# Patient Record
Sex: MALE | Race: WHITE | NOT HISPANIC OR LATINO | URBAN - METROPOLITAN AREA
[De-identification: names, ages, dates, MRNs, and addresses within clinical notes are randomized per-mention and may not be internally consistent; named-entity substitution may affect disease eponyms.]

---

## 2024-06-17 ENCOUNTER — APPOINTMENT (OUTPATIENT)
Dept: LAB | Facility: HOSPITAL | Age: 44
End: 2024-06-17
Payer: COMMERCIAL

## 2024-06-17 DIAGNOSIS — I50.20 HFREF (HEART FAILURE WITH REDUCED EJECTION FRACTION) (HCC): ICD-10-CM

## 2024-06-17 DIAGNOSIS — E78.2 MIXED HYPERLIPIDEMIA: ICD-10-CM

## 2024-06-17 LAB
ALBUMIN SERPL BCP-MCNC: 4.6 G/DL (ref 3.5–5)
ALP SERPL-CCNC: 55 U/L (ref 34–104)
ALT SERPL W P-5'-P-CCNC: 15 U/L (ref 7–52)
ANION GAP SERPL CALCULATED.3IONS-SCNC: 7 MMOL/L (ref 4–13)
AST SERPL W P-5'-P-CCNC: 27 U/L (ref 13–39)
BASOPHILS # BLD AUTO: 0.1 THOUSANDS/ÂΜL (ref 0–0.1)
BASOPHILS NFR BLD AUTO: 1 % (ref 0–1)
BILIRUB SERPL-MCNC: 1.58 MG/DL (ref 0.2–1)
BUN SERPL-MCNC: 10 MG/DL (ref 5–25)
CALCIUM SERPL-MCNC: 9.7 MG/DL (ref 8.4–10.2)
CHLORIDE SERPL-SCNC: 104 MMOL/L (ref 96–108)
CHOLEST SERPL-MCNC: 187 MG/DL
CO2 SERPL-SCNC: 28 MMOL/L (ref 21–32)
CREAT SERPL-MCNC: 0.79 MG/DL (ref 0.6–1.3)
EOSINOPHIL # BLD AUTO: 0.23 THOUSAND/ÂΜL (ref 0–0.61)
EOSINOPHIL NFR BLD AUTO: 2 % (ref 0–6)
ERYTHROCYTE [DISTWIDTH] IN BLOOD BY AUTOMATED COUNT: 12.7 % (ref 11.6–15.1)
GFR SERPL CREATININE-BSD FRML MDRD: 107 ML/MIN/1.73SQ M
GLUCOSE P FAST SERPL-MCNC: 122 MG/DL (ref 65–99)
HCT VFR BLD AUTO: 53.2 % (ref 36.5–49.3)
HDLC SERPL-MCNC: 36 MG/DL
HGB BLD-MCNC: 17.5 G/DL (ref 12–17)
IMM GRANULOCYTES # BLD AUTO: 0.05 THOUSAND/UL (ref 0–0.2)
IMM GRANULOCYTES NFR BLD AUTO: 0 % (ref 0–2)
LDLC SERPL CALC-MCNC: 129 MG/DL (ref 0–100)
LYMPHOCYTES # BLD AUTO: 2.58 THOUSANDS/ÂΜL (ref 0.6–4.47)
LYMPHOCYTES NFR BLD AUTO: 18 % (ref 14–44)
MCH RBC QN AUTO: 29.8 PG (ref 26.8–34.3)
MCHC RBC AUTO-ENTMCNC: 32.9 G/DL (ref 31.4–37.4)
MCV RBC AUTO: 91 FL (ref 82–98)
MONOCYTES # BLD AUTO: 1.04 THOUSAND/ÂΜL (ref 0.17–1.22)
MONOCYTES NFR BLD AUTO: 7 % (ref 4–12)
NEUTROPHILS # BLD AUTO: 10.15 THOUSANDS/ÂΜL (ref 1.85–7.62)
NEUTS SEG NFR BLD AUTO: 72 % (ref 43–75)
NONHDLC SERPL-MCNC: 151 MG/DL
NRBC BLD AUTO-RTO: 0 /100 WBCS
PLATELET # BLD AUTO: 198 THOUSANDS/UL (ref 149–390)
PMV BLD AUTO: 13.2 FL (ref 8.9–12.7)
POTASSIUM SERPL-SCNC: 4.4 MMOL/L (ref 3.5–5.3)
PROT SERPL-MCNC: 7.6 G/DL (ref 6.4–8.4)
RBC # BLD AUTO: 5.87 MILLION/UL (ref 3.88–5.62)
SODIUM SERPL-SCNC: 139 MMOL/L (ref 135–147)
TRIGL SERPL-MCNC: 110 MG/DL
WBC # BLD AUTO: 14.15 THOUSAND/UL (ref 4.31–10.16)

## 2024-06-17 PROCEDURE — 80061 LIPID PANEL: CPT

## 2024-06-17 PROCEDURE — 36415 COLL VENOUS BLD VENIPUNCTURE: CPT

## 2024-06-17 PROCEDURE — 85025 COMPLETE CBC W/AUTO DIFF WBC: CPT

## 2024-06-17 PROCEDURE — 80053 COMPREHEN METABOLIC PANEL: CPT

## 2025-04-30 ENCOUNTER — TELEPHONE (OUTPATIENT)
Dept: UROLOGY | Facility: CLINIC | Age: 45
End: 2025-04-30

## 2025-04-30 NOTE — TELEPHONE ENCOUNTER
Called and left the pt 2 VM-   1st vm was that his appt would be cx as the insurance he has I out of network as it is a [employer] insurance-     As I was in the pts chart Mora Quarles was in there and I messaged her in regards to the insurance, she stated she was on the phone with [employer] in regards to the pt patients insurance, after speaking to her the pt can come here and the insurance was put in incorrectly.     I called the pt back and left another VM that his appt is back on for 5/2/25 at 7:20am- let pt know that his insurance was put in the system wrong and that we do accept the insurance that he has, I apologized for the mishap and reassured him in the message that the appt is good for Friday and we will see him then.     Provided the patient with a call back number in the 1st message- if pt calls back please advise him that the appt for Friday is still on and that his insurance is getting corrected in the system.

## 2025-04-30 NOTE — TELEPHONE ENCOUNTER
Attempted to call pt again just to confirm he received the vm for his appt on Friday that he is still on.. phone seemed like it was answered but the other end there was silence.

## 2025-05-01 NOTE — PROGRESS NOTES
Name: Omi Boone Jr.      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 Surgical margins negative for carcinoma.   Lymphovascular invasion not identified.   - Obtain CT abdomen, CT chest, and CMP  - Follow up to review results.   - I have strongly recommended complete smoking cessation and he is working on stopping this and has successfully cut back.   Orders:    CT abdomen w wo contrast; Future    CT chest wo contrast; Future    Comprehensive metabolic panel; Future        History of Present Illness   Omi Boone Jr. is a 45 y.o. male who presents for new patient evaluation of RCC. Transfer of care from [facility] urology. [facility] left renal mass. Pathology positive for grade 2, clear cell RCC. He reports prior to surgery he had a right renal vein thrombosis and was on Eliquis which was discontinued due to this resolving. He continues to smoke cigarettes daily however is trying to quit. He does have history of CHF and MI. He notes occasional flank pain to right kidney. No urinary complaints or hematuria. No family history of  malignancy.     Review of Systems   Constitutional:  Negative for chills and fever.   Respiratory:  Negative for shortness of breath.    Cardiovascular:  Negative for chest pain.   Gastrointestinal:  Negative for abdominal pain.   Genitourinary:  Negative for difficulty urinating, dysuria, flank pain, frequency, hematuria and urgency.   Neurological:  Negative for dizziness.          Objective   There were no vitals taken for this visit.    Physical Exam  Constitutional:        "Appearance: Normal appearance.   HENT:      Head: Normocephalic and atraumatic.      Right Ear: External ear normal.      Left Ear: External ear normal.      Nose: Nose normal.   Eyes:      General: No scleral icterus.     Conjunctiva/sclera: Conjunctivae normal.   Cardiovascular:      Pulses: Normal pulses.   Pulmonary:      Effort: Pulmonary effort is normal.   Musculoskeletal:         General: Normal range of motion.      Cervical back: Normal range of motion.   Neurological:      General: No focal deficit present.      Mental Status: He is alert and oriented to person, place, and time.   Psychiatric:         Mood and Affect: Mood normal.         Behavior: Behavior normal.         Thought Content: Thought content normal.         Judgment: Judgment normal.           Results   No results found for: \"PSA\"  Lab Results   Component Value Date    CALCIUM 9.7 12/19/2024    K 4.3 12/19/2024    CO2 23 12/19/2024     12/19/2024    BUN 16 12/19/2024    CREATININE 1.52 (H) 12/19/2024     Lab Results   Component Value Date    WBC 14.15 (H) 06/17/2024    HGB 14.6 12/16/2024    HCT 43.2 12/16/2024    MCV 91 06/17/2024     06/17/2024       Office Urine Dip  No results found for this or any previous visit (from the past hour).      "

## 2025-05-02 ENCOUNTER — OFFICE VISIT (OUTPATIENT)
Dept: UROLOGY | Facility: CLINIC | Age: 45
End: 2025-05-02
Payer: COMMERCIAL

## 2025-05-02 VITALS
HEIGHT: 73 IN | BODY MASS INDEX: 31.17 KG/M2 | DIASTOLIC BLOOD PRESSURE: 72 MMHG | WEIGHT: 235.2 LBS | HEART RATE: 79 BPM | SYSTOLIC BLOOD PRESSURE: 114 MMHG | OXYGEN SATURATION: 96 % | TEMPERATURE: 97.6 F | RESPIRATION RATE: 18 BRPM

## 2025-05-02 DIAGNOSIS — C64.2 RENAL CELL CARCINOMA OF LEFT KIDNEY (HCC): Primary | ICD-10-CM

## 2025-05-02 PROCEDURE — 99204 OFFICE O/P NEW MOD 45 MIN: CPT | Performed by: PHYSICIAN ASSISTANT

## 2025-05-02 RX ORDER — PANTOPRAZOLE SODIUM 40 MG/1
1 TABLET, DELAYED RELEASE ORAL EVERY MORNING
COMMUNITY
Start: 2025-02-14

## 2025-05-02 RX ORDER — CARVEDILOL 12.5 MG/1
1 TABLET ORAL 2 TIMES DAILY WITH MEALS
COMMUNITY
Start: 2025-02-04

## 2025-05-02 RX ORDER — ASPIRIN 81 MG/1
81 TABLET, CHEWABLE ORAL DAILY
COMMUNITY

## 2025-05-02 RX ORDER — SPIRONOLACTONE 25 MG/1
25 TABLET ORAL DAILY
COMMUNITY
Start: 2025-02-14 | End: 2026-02-14

## 2025-05-02 RX ORDER — PRASUGREL 10 MG/1
10 TABLET, FILM COATED ORAL DAILY
COMMUNITY
Start: 2024-07-25 | End: 2025-07-25

## 2025-05-02 RX ORDER — ZOLPIDEM TARTRATE 10 MG/1
TABLET ORAL
COMMUNITY

## 2025-05-02 RX ORDER — SERTRALINE HYDROCHLORIDE 25 MG/1
25 TABLET, FILM COATED ORAL DAILY
COMMUNITY
Start: 2024-11-27 | End: 2025-11-27

## 2025-05-02 RX ORDER — ROSUVASTATIN CALCIUM 40 MG/1
40 TABLET, COATED ORAL
COMMUNITY
Start: 2025-02-14

## 2025-05-27 ENCOUNTER — OFFICE VISIT (OUTPATIENT)
Dept: FAMILY MEDICINE CLINIC | Facility: CLINIC | Age: 45
End: 2025-05-27
Payer: COMMERCIAL

## 2025-05-27 VITALS
HEIGHT: 73 IN | RESPIRATION RATE: 18 BRPM | DIASTOLIC BLOOD PRESSURE: 84 MMHG | SYSTOLIC BLOOD PRESSURE: 112 MMHG | HEART RATE: 70 BPM | OXYGEN SATURATION: 97 % | WEIGHT: 229 LBS | BODY MASS INDEX: 30.35 KG/M2

## 2025-05-27 DIAGNOSIS — R73.9 ELEVATED BLOOD SUGAR: ICD-10-CM

## 2025-05-27 DIAGNOSIS — Z11.59 NEED FOR HEPATITIS C SCREENING TEST: ICD-10-CM

## 2025-05-27 DIAGNOSIS — I42.9 CARDIOMYOPATHY, UNSPECIFIED TYPE (HCC): ICD-10-CM

## 2025-05-27 DIAGNOSIS — Z90.5 STATUS POST NEPHRECTOMY: ICD-10-CM

## 2025-05-27 DIAGNOSIS — I25.10 CORONARY ARTERY DISEASE INVOLVING NATIVE HEART WITHOUT ANGINA PECTORIS, UNSPECIFIED VESSEL OR LESION TYPE: ICD-10-CM

## 2025-05-27 DIAGNOSIS — Z12.11 COLON CANCER SCREENING: ICD-10-CM

## 2025-05-27 DIAGNOSIS — K21.9 GASTROESOPHAGEAL REFLUX DISEASE, UNSPECIFIED WHETHER ESOPHAGITIS PRESENT: ICD-10-CM

## 2025-05-27 DIAGNOSIS — G47.00 INSOMNIA, UNSPECIFIED TYPE: ICD-10-CM

## 2025-05-27 DIAGNOSIS — Z00.00 HEALTH MAINTENANCE EXAMINATION: Primary | ICD-10-CM

## 2025-05-27 DIAGNOSIS — I10 PRIMARY HYPERTENSION: ICD-10-CM

## 2025-05-27 DIAGNOSIS — F32.A DEPRESSION, UNSPECIFIED DEPRESSION TYPE: ICD-10-CM

## 2025-05-27 DIAGNOSIS — Z72.0 TOBACCO USE: ICD-10-CM

## 2025-05-27 DIAGNOSIS — Z11.4 SCREENING FOR HIV (HUMAN IMMUNODEFICIENCY VIRUS): ICD-10-CM

## 2025-05-27 DIAGNOSIS — Z95.810 ICD (IMPLANTABLE CARDIOVERTER-DEFIBRILLATOR) IN PLACE: ICD-10-CM

## 2025-05-27 DIAGNOSIS — E78.5 HYPERLIPIDEMIA, UNSPECIFIED HYPERLIPIDEMIA TYPE: ICD-10-CM

## 2025-05-27 DIAGNOSIS — R11.2 NAUSEA AND VOMITING, UNSPECIFIED VOMITING TYPE: ICD-10-CM

## 2025-05-27 PROBLEM — Z95.0 PACEMAKER: Status: ACTIVE | Noted: 2025-05-27

## 2025-05-27 PROCEDURE — 99386 PREV VISIT NEW AGE 40-64: CPT | Performed by: FAMILY MEDICINE

## 2025-05-27 RX ORDER — ZOLPIDEM TARTRATE 10 MG/1
10 TABLET ORAL
Qty: 90 TABLET | Refills: 1 | Status: SHIPPED | OUTPATIENT
Start: 2025-05-27

## 2025-05-27 RX ORDER — MIRTAZAPINE 45 MG/1
45 TABLET, FILM COATED ORAL EVERY EVENING
COMMUNITY
Start: 2025-05-03

## 2025-05-27 RX ORDER — ONDANSETRON 4 MG/1
4 TABLET, ORALLY DISINTEGRATING ORAL EVERY 6 HOURS PRN
Qty: 30 TABLET | Refills: 5 | Status: SHIPPED | OUTPATIENT
Start: 2025-05-27

## 2025-05-27 NOTE — PROGRESS NOTES
Depression Screening and Follow-up Plan: Patient's depression screening was positive with a PHQ-2 score of 3. Their PHQ-9 score was 11.       Tobacco Cessation Counseling: Tobacco cessation counseling was provided. The patient is sincerely urged to quit consumption of tobacco. He is not ready to quit tobacco.     Assessment/Plan:    Return visit in 6 months.     Problem List Items Addressed This Visit       Cardiomyopathy (HCC)    Coronary artery disease involving native heart without angina pectoris    Depression    Relevant Medications    mirtazapine (REMERON) 45 MG tablet    zolpidem (AMBIEN) 10 mg tablet    Elevated blood sugar    Relevant Orders    Hemoglobin A1C    Gastroesophageal reflux disease    Health maintenance examination - Primary    Hyperlipidemia    Relevant Orders    CBC and differential    Comprehensive metabolic panel    Lipid panel    ICD (implantable cardioverter-defibrillator) in place    Insomnia    Relevant Medications    zolpidem (AMBIEN) 10 mg tablet    Primary hypertension    Status post nephrectomy    Relevant Orders    UA (URINE) with reflex to Scope    Tobacco use     Other Visit Diagnoses         Nausea and vomiting, unspecified vomiting type        Relevant Medications    ondansetron (ZOFRAN-ODT) 4 mg disintegrating tablet    Other Relevant Orders    Ambulatory Referral to Gastroenterology      Colon cancer screening        Relevant Orders    Ambulatory Referral to Gastroenterology      Screening for HIV (human immunodeficiency virus)        Relevant Orders    HIV 1/2 AB/AG w Reflex [facility] for 2 yr old and above      Need for hepatitis C screening test        Relevant Orders    Hepatitis C antibody              Subjective:      Patient ID: Omi Laurentkerry Shi is a 45 y.o. male.    This is a new patient to our practice.  He has coronary artery disease and has had 2 stenting procedures.  He had a MI with ejection fraction now at 30 to 40%.  Last December he had CAT scan of chest which  showed renal mass.  He had nephrectomy for renal cell carcinoma.  He has syncope 1-2 times a week when he gets up from bed.  This improved with recent discontinuation of Imdur.  He also has nausea and vomiting every morning for the last year.        The following portions of the patient's history were reviewed and updated as appropriate:   Past Medical History:  He has a past medical history of Anxiety (2010), Cancer (HCC) (2024), Chronic kidney disease (2024), Coronary artery disease (2023), Depression (2010), Hypertension (2023), and Myocardial infarction (HCC) (2023).,  _______________________________________________________________________  Medical Problems:  does not have any pertinent problems on file.,  _______________________________________________________________________  Past Surgical History:   has a past surgical history that includes Cardiac surgery and Knee surgery.,  _______________________________________________________________________  Family History:  family history includes Anxiety disorder in his mother and sister; Bipolar disorder in his mother; Breast cancer in his mother; Cancer in his maternal grandfather; Diabetes in his paternal grandmother; Heart disease in his maternal grandmother.,  _______________________________________________________________________  Social History:   reports that he has been smoking cigarettes. He has a 15 pack-year smoking history. He has never used smokeless tobacco. He reports that he does not drink alcohol and does not use drugs.,  _______________________________________________________________________  Allergies:  has no known allergies..  _______________________________________________________________________  Current Medications[1]  _______________________________________________________________________  Review of Systems   Constitutional: Negative.    Respiratory: Negative.     Cardiovascular: Negative.    Gastrointestinal:  Positive for nausea and  "vomiting.   Neurological:  Positive for syncope.         Objective:  Vitals:    05/27/25 1443   BP: 112/84   BP Location: Left arm   Patient Position: Sitting   Cuff Size: Large   Pulse: 70   Resp: 18   SpO2: 97%   Weight: 104 kg (229 lb)   Height: 6' 1\" (1.854 m)     Body mass index is 30.21 kg/m².     Physical Exam  Constitutional:       Appearance: Normal appearance. He is well-developed.   HENT:      Head: Normocephalic and atraumatic.     Eyes:      Pupils: Pupils are equal, round, and reactive to light.       Cardiovascular:      Rate and Rhythm: Normal rate and regular rhythm.      Heart sounds: Normal heart sounds.   Pulmonary:      Effort: Pulmonary effort is normal.      Breath sounds: Normal breath sounds.   Abdominal:      General: Bowel sounds are normal.     Musculoskeletal:         General: Normal range of motion.      Cervical back: Neck supple.     Skin:     General: Skin is warm and dry.     Neurological:      Mental Status: He is alert.     Psychiatric:         Mood and Affect: Mood normal.         Behavior: Behavior normal.              [1]   Current Outpatient Medications   Medication Sig Dispense Refill    aspirin 81 mg chewable tablet Chew 81 mg in the morning.      carvedilol (COREG) 12.5 mg tablet Take 1 tablet by mouth in the morning and 1 tablet in the evening. Take with meals.      mirtazapine (REMERON) 45 MG tablet Take 45 mg by mouth every evening      ondansetron (ZOFRAN-ODT) 4 mg disintegrating tablet Take 1 tablet (4 mg total) by mouth every 6 (six) hours as needed for nausea or vomiting 30 tablet 5    pantoprazole (PROTONIX) 40 mg tablet Take 1 tablet by mouth every morning      prasugrel (EFFIENT) tablet Take 10 mg by mouth in the morning.      rosuvastatin (CRESTOR) 40 MG tablet Take 40 mg by mouth      sertraline (ZOLOFT) 25 mg tablet Take 25 mg by mouth in the morning.      spironolactone (ALDACTONE) 25 mg tablet Take 25 mg by mouth in the morning.      zolpidem (AMBIEN) 10 mg " tablet Take 1 tablet (10 mg total) by mouth daily at bedtime as needed for sleep 90 tablet 1     No current facility-administered medications for this visit.

## 2025-05-29 ENCOUNTER — TELEPHONE (OUTPATIENT)
Dept: GASTROENTEROLOGY | Facility: CLINIC | Age: 45
End: 2025-05-29

## 2025-05-29 NOTE — TELEPHONE ENCOUNTER
Left message for patient to return call to schedule an appointment    R11.2 (ICD-10-CM) - Nausea and vomiting, unspecified vomiting type  Z12.11 (ICD-10-CM) - Colon cancer screening

## 2025-06-03 ENCOUNTER — TELEPHONE (OUTPATIENT)
Dept: UROLOGY | Facility: CLINIC | Age: 45
End: 2025-06-03

## 2025-06-03 NOTE — TELEPHONE ENCOUNTER
This nurse attempted to call patient to make him aware of Shannon OLSEN instructions to have CMP prior to having CT scan on 6/9/25. Detailed voicemail left providing office phone number and lab hours.     If patient calls back please relay message accordingly. Thank you!!     Shannon Driscoll PA-C  [facility] [place] [facility]  Please inform patient to go for CMP prior to CT

## 2025-06-04 ENCOUNTER — APPOINTMENT (EMERGENCY)
Dept: CT IMAGING | Facility: HOSPITAL | Age: 45
End: 2025-06-04
Payer: COMMERCIAL

## 2025-06-04 ENCOUNTER — HOSPITAL ENCOUNTER (EMERGENCY)
Facility: HOSPITAL | Age: 45
Discharge: HOME | End: 2025-06-04
Attending: EMERGENCY MEDICINE
Payer: COMMERCIAL

## 2025-06-04 VITALS
DIASTOLIC BLOOD PRESSURE: 74 MMHG | HEART RATE: 71 BPM | SYSTOLIC BLOOD PRESSURE: 126 MMHG | RESPIRATION RATE: 17 BRPM | BODY MASS INDEX: 29.69 KG/M2 | WEIGHT: 225 LBS | OXYGEN SATURATION: 98 % | TEMPERATURE: 97.3 F

## 2025-06-04 DIAGNOSIS — Z95.810 ICD (IMPLANTABLE CARDIOVERTER-DEFIBRILLATOR) IN PLACE: ICD-10-CM

## 2025-06-04 DIAGNOSIS — R55 SYNCOPE: Primary | ICD-10-CM

## 2025-06-04 LAB
2HR DELTA HS TROPONIN: 0 NG/L
4HR DELTA HS TROPONIN: 1 NG/L
ALBUMIN SERPL BCG-MCNC: 4.7 G/DL (ref 3.5–5)
ALP SERPL-CCNC: 56 U/L (ref 34–104)
ALT SERPL W P-5'-P-CCNC: 16 U/L (ref 7–52)
ANION GAP SERPL CALCULATED.3IONS-SCNC: 9 MMOL/L (ref 4–13)
AST SERPL W P-5'-P-CCNC: 21 U/L (ref 13–39)
ATRIAL RATE: 68 BPM
ATRIAL RATE: 69 BPM
BASOPHILS # BLD AUTO: 0.09 THOUSANDS/ÂΜL (ref 0–0.1)
BASOPHILS NFR BLD AUTO: 1 % (ref 0–1)
BILIRUB SERPL-MCNC: 1.12 MG/DL (ref 0.2–1)
BNP SERPL-MCNC: 37 PG/ML (ref 0–100)
BUN SERPL-MCNC: 14 MG/DL (ref 5–25)
CALCIUM SERPL-MCNC: 10.1 MG/DL (ref 8.4–10.2)
CARDIAC TROPONIN I PNL SERPL HS: 6 NG/L (ref ?–50)
CARDIAC TROPONIN I PNL SERPL HS: 6 NG/L (ref ?–50)
CARDIAC TROPONIN I PNL SERPL HS: 7 NG/L (ref ?–50)
CHLORIDE SERPL-SCNC: 102 MMOL/L (ref 96–108)
CO2 SERPL-SCNC: 27 MMOL/L (ref 21–32)
CREAT SERPL-MCNC: 1.37 MG/DL (ref 0.6–1.3)
EOSINOPHIL # BLD AUTO: 0.4 THOUSAND/ÂΜL (ref 0–0.61)
EOSINOPHIL NFR BLD AUTO: 4 % (ref 0–6)
ERYTHROCYTE [DISTWIDTH] IN BLOOD BY AUTOMATED COUNT: 12.3 % (ref 11.6–15.1)
GFR SERPL CREATININE-BSD FRML MDRD: 60 ML/MIN/1.73SQ M
GLUCOSE SERPL-MCNC: 102 MG/DL (ref 65–140)
HCT VFR BLD AUTO: 49.4 % (ref 36.5–49.3)
HGB BLD-MCNC: 17.1 G/DL (ref 12–17)
IMM GRANULOCYTES # BLD AUTO: 0.04 THOUSAND/UL (ref 0–0.2)
IMM GRANULOCYTES NFR BLD AUTO: 0 % (ref 0–2)
LYMPHOCYTES # BLD AUTO: 2.32 THOUSANDS/ÂΜL (ref 0.6–4.47)
LYMPHOCYTES NFR BLD AUTO: 21 % (ref 14–44)
MAGNESIUM SERPL-MCNC: 1.9 MG/DL (ref 1.9–2.7)
MCH RBC QN AUTO: 31 PG (ref 26.8–34.3)
MCHC RBC AUTO-ENTMCNC: 34.6 G/DL (ref 31.4–37.4)
MCV RBC AUTO: 90 FL (ref 82–98)
MONOCYTES # BLD AUTO: 0.75 THOUSAND/ÂΜL (ref 0.17–1.22)
MONOCYTES NFR BLD AUTO: 7 % (ref 4–12)
NEUTROPHILS # BLD AUTO: 7.25 THOUSANDS/ÂΜL (ref 1.85–7.62)
NEUTS SEG NFR BLD AUTO: 67 % (ref 43–75)
NRBC BLD AUTO-RTO: 0 /100 WBCS
P AXIS: 49 DEGREES
P AXIS: 67 DEGREES
PLATELET # BLD AUTO: 198 THOUSANDS/UL (ref 149–390)
PMV BLD AUTO: 11.5 FL (ref 8.9–12.7)
POTASSIUM SERPL-SCNC: 4.1 MMOL/L (ref 3.5–5.3)
PR INTERVAL: 172 MS
PR INTERVAL: 176 MS
PROT SERPL-MCNC: 7.9 G/DL (ref 6.4–8.4)
QRS AXIS: -42 DEGREES
QRS AXIS: -45 DEGREES
QRSD INTERVAL: 94 MS
QRSD INTERVAL: 98 MS
QT INTERVAL: 392 MS
QT INTERVAL: 398 MS
QTC INTERVAL: 420 MS
QTC INTERVAL: 423 MS
RBC # BLD AUTO: 5.51 MILLION/UL (ref 3.88–5.62)
SODIUM SERPL-SCNC: 138 MMOL/L (ref 135–147)
T WAVE AXIS: 56 DEGREES
T WAVE AXIS: 74 DEGREES
VENTRICULAR RATE: 68 BPM
VENTRICULAR RATE: 69 BPM
WBC # BLD AUTO: 10.85 THOUSAND/UL (ref 4.31–10.16)

## 2025-06-04 PROCEDURE — 84484 ASSAY OF TROPONIN QUANT: CPT | Performed by: EMERGENCY MEDICINE

## 2025-06-04 PROCEDURE — 93010 ELECTROCARDIOGRAM REPORT: CPT | Performed by: INTERNAL MEDICINE

## 2025-06-04 PROCEDURE — 83735 ASSAY OF MAGNESIUM: CPT | Performed by: EMERGENCY MEDICINE

## 2025-06-04 PROCEDURE — 74177 CT ABD & PELVIS W/CONTRAST: CPT

## 2025-06-04 PROCEDURE — 99285 EMERGENCY DEPT VISIT HI MDM: CPT

## 2025-06-04 PROCEDURE — 36415 COLL VENOUS BLD VENIPUNCTURE: CPT | Performed by: EMERGENCY MEDICINE

## 2025-06-04 PROCEDURE — 71275 CT ANGIOGRAPHY CHEST: CPT

## 2025-06-04 PROCEDURE — 80053 COMPREHEN METABOLIC PANEL: CPT | Performed by: EMERGENCY MEDICINE

## 2025-06-04 PROCEDURE — 85025 COMPLETE CBC W/AUTO DIFF WBC: CPT | Performed by: EMERGENCY MEDICINE

## 2025-06-04 PROCEDURE — 83880 ASSAY OF NATRIURETIC PEPTIDE: CPT | Performed by: EMERGENCY MEDICINE

## 2025-06-04 PROCEDURE — 99285 EMERGENCY DEPT VISIT HI MDM: CPT | Performed by: EMERGENCY MEDICINE

## 2025-06-04 PROCEDURE — 93005 ELECTROCARDIOGRAM TRACING: CPT

## 2025-06-04 RX ADMIN — IOHEXOL 100 ML: 350 INJECTION, SOLUTION INTRAVENOUS at 12:07

## 2025-06-04 NOTE — TELEPHONE ENCOUNTER
I called debbie and informed him that he does not need any additional testing. He expressed understanding

## 2025-06-04 NOTE — TELEPHONE ENCOUNTER
Patient had a CT today while in the ED for a heart condition, also had CMP    He wants to  be sure that he does not need to complete the CT placed by Shannon. Informed patient not likely that he will need to repeat but will let provider be aware.

## 2025-06-04 NOTE — ED PROVIDER NOTES
Time reflects when diagnosis was documented in both MDM as applicable and the Disposition within this note       Time User Action Codes Description Comment    6/4/2025  1:34 PM Shelley Tejeda [R55] Syncope     6/4/2025  1:34 PM Shelley Tejeda [Z95.810] ICD (implantable cardioverter-defibrillator) in place           ED Disposition       ED Disposition   Discharge    Condition   Stable    Date/Time   Wed Jun 4, 2025  1:34 PM    Comment   Omi Boone Jr. discharge to home/self care.                   Assessment & Plan       Medical Decision Making  45 y.o. male presents for evaluation after a possible syncopal episode with a possible AICD delivered shock. On exam, patient with normal vitals, in no acute distress. His heart is regular rate and rhythm.  EKG shows a normal sinus rhythm with a left axis deviation, ST segment or T wave abnormalities.  Differential diagnosis includes, but is not limited to, cardiac arrhythmia, ACS, acute electrolyte disturbances, or other acute pathology.  Will evaluate with CBC, CMP, troponin, BNP, magnesium level.  Will interrogate patient's AICD.    Amount and/or Complexity of Data Reviewed  Labs: ordered. Decision-making details documented in ED Course.  Radiology: ordered. Decision-making details documented in ED Course.    Risk  Prescription drug management.        ED Course as of 06/04/25 1335   Wed Jun 04, 2025   0916 Procedure Note: EKG  Date/Time: 06/04/25 9:16 AM   Interpreted by: Shelley Tejeda D.O.  Indications / Diagnosis: AICD discharge  ECG reviewed by me, the ED Provider: yes   The EKG demonstrates:  Rhythm: normal sinus  Intervals: normal intervals  Axis: left axis  QRS/Blocks: normal QRS  ST Changes: No acute ST Changes, no STD/JAIME.   0927 WBC(!): 10.85   0927 Hemoglobin(!): 17.1   0928 CBC appears mildly hemoconcentrated with an elevated Hgb.    0950 Comprehensive metabolic panel(!)  Unremarkable when compared to previous.    0950 MAGNESIUM: 1.9   0950 hs TnI 0hr: 6    0950 BNP: 37   0950 Patient's AICD was interrogated.  Per interrogation, no shocks delivered.  There was an event recorded at 7:50 AM today, however, unsure of what this event was. Will touch base with cardiology.    1120 On-call cardiologist states that they will look further into the AICD interrogation report to figure out what the event was this morning. Will continue to monitor patient.    1153 AICD report read by cardiology who talked to [employer] representatives.  No events noted this morning, the event that was recorded was a routine test.  Given the patient's near syncopal episode without other explanation, concern for possible PE, aortic pathology, or other acute pathology.  Patient is scheduled to have a CT scan of the chest, as well as a CT scan of the abdomen and pelvis later this week as he recently had severe right sided abdominal pain and has been having almost daily nausea and vomiting since his nephrectomy in December.  Discussed with patient and his wife at bedside, will obtain CTA PE study with abdomen pelvis at this time for further evaluation, and then patient will not have to have the CT scans performed later this week.  They are agreeable.  CTA ordered at this time.   1156 Delta 2hr hsTnI: 0   1309 CT pe study w abdomen pelvis w contrast  Unremarkable, no PE or other acute pathology.    1331 Discussed test results with patient and his wife at bedside.  Patient's symptoms may have been due to a vasovagal event.  He was encouraged to follow-up with his cardiologist for further evaluation.  He was also told to touch base with the urology team to cancel his upcoming CT scans.  Patient stable for discharge at this time.  He was given symptomatic care instructions and strict ED return precautions.       Medications   iohexol (OMNIPAQUE) 350 MG/ML injection (MULTI-DOSE) 100 mL (100 mL Intravenous Given 6/4/25 1207)       ED Risk Strat Scores                    No data  recorded            Wells' Criteria for PE      Flowsheet Row Most Recent Value   Wells' Criteria for PE    Clinical signs and symptoms of DVT 0 Filed at: 06/04/2025 1147   PE is primary diagnosis or equally likely 0 Filed at: 06/04/2025 1147   HR >100 0 Filed at: 06/04/2025 1147   Immobilization at least 3 days or Surgery in the previous 4 weeks 0 Filed at: 06/04/2025 1147   Previous, objectively diagnosed PE or DVT 0 Filed at: 06/04/2025 1147   Hemoptysis 0 Filed at: 06/04/2025 1147   Malignancy with treatment within 6 months or palliative 1 Filed at: 06/04/2025 1147   Wells' Criteria Total 1 Filed at: 06/04/2025 1147                        History of Present Illness       Chief Complaint   Patient presents with    Medical Problem - Re-Evaluation     Defibrillator went off around 8 this morning       Past Medical History[1]   Past Surgical History[2]   Family History[3]   Social History[4]   E-Cigarette/Vaping    E-Cigarette Use Never User       E-Cigarette/Vaping Substances    Nicotine No     THC No     CBD No     Flavoring No     Unknown No       I have reviewed and agree with the history as documented.     45-year-old male with a past medical history of cardiomyopathy presents for evaluation after AICD shocked him.  Patient states that he believes he may have passed out, states he is slumped over at his desk.  The next thing he knew, he felt something strange in his chest, believes his AICD may have shocked him.  He now reports feeling shaky and generally weak.  He denies any chest pain or shortness of breath currently.  No other concerns at this time.        Review of Systems   Respiratory:  Negative for shortness of breath.    Cardiovascular:  Negative for chest pain.   Neurological:  Positive for syncope.   All other systems reviewed and are negative.          Objective       ED Triage Vitals   Temperature Pulse Blood Pressure Respirations SpO2 Patient Position - Orthostatic VS   06/04/25 0904 06/04/25 0904  06/04/25 0904 06/04/25 0904 06/04/25 0904 06/04/25 0904   (!) 97.3 °F (36.3 °C) 68 130/86 16 100 % Sitting      Temp Source Heart Rate Source BP Location FiO2 (%) Pain Score    06/04/25 0904 06/04/25 0904 06/04/25 0904 -- 06/04/25 1130    Oral Monitor Left arm  No Pain      Vitals      Date and Time Temp Pulse SpO2 Resp BP Pain Score FACES Pain Rating User   06/04/25 1300 -- 71 98 % 17 126/74 -- -- SM   06/04/25 1215 -- 79 100 % 14 132/83 No Pain -- SM   06/04/25 1130 -- 71 99 % 11 124/75 No Pain -- SM   06/04/25 1000 -- 65 97 % 17 130/90 -- -- SM   06/04/25 0945 -- 63 100 % 17 118/77 -- -- SM   06/04/25 0904 97.3 °F (36.3 °C) 68 100 % 16 130/86 -- -- LM            Physical Exam  Vitals and nursing note reviewed.   Constitutional:       General: He is awake. He is not in acute distress.     Appearance: He is not toxic-appearing.   HENT:      Head: Normocephalic and atraumatic.     Eyes:      General: Vision grossly intact. Gaze aligned appropriately.       Cardiovascular:      Rate and Rhythm: Normal rate and regular rhythm.      Heart sounds: Normal heart sounds.   Pulmonary:      Effort: Pulmonary effort is normal. No respiratory distress.      Breath sounds: Normal breath sounds.     Musculoskeletal:      Cervical back: Full passive range of motion without pain and neck supple.     Skin:     General: Skin is warm and dry.     Neurological:      General: No focal deficit present.      Mental Status: He is alert and oriented to person, place, and time.         Results Reviewed       Procedure Component Value Units Date/Time    HS Troponin I 4hr [[id number]] Collected: 06/04/25 1325    Lab Status: In process Specimen: Blood from Arm, Right Updated: 06/04/25 1328    HS Troponin I 2hr [[id number]]  (Normal) Collected: 06/04/25 1121    Lab Status: Final result Specimen: Blood from Arm, Right Updated: 06/04/25 1153     hs TnI 2hr 6 ng/L      Delta 2hr hsTnI 0 ng/L     B-Type Natriuretic Peptide(BNP) [[id number]]  (Normal)  Collected: 06/04/25 0919    Lab Status: Final result Specimen: Blood from Arm, Right Updated: 06/04/25 0948     BNP 37 pg/mL     HS Troponin 0hr (reflex protocol) [[id number]]  (Normal) Collected: 06/04/25 0919    Lab Status: Final result Specimen: Blood from Arm, Right Updated: 06/04/25 0947     hs TnI 0hr 6 ng/L     Comprehensive metabolic panel [[id number]]  (Abnormal) Collected: 06/04/25 0919    Lab Status: Final result Specimen: Blood from Arm, Right Updated: 06/04/25 0938     Sodium 138 mmol/L      Potassium 4.1 mmol/L      Chloride 102 mmol/L      CO2 27 mmol/L      ANION GAP 9 mmol/L      BUN 14 mg/dL      Creatinine 1.37 mg/dL      Glucose 102 mg/dL      Calcium 10.1 mg/dL      AST 21 U/L      ALT 16 U/L      Alkaline Phosphatase 56 U/L      Total Protein 7.9 g/dL      Albumin 4.7 g/dL      Total Bilirubin 1.12 mg/dL      eGFR 60 ml/min/1.73sq m     Narrative:      National Kidney Disease Foundation guidelines for Chronic Kidney Disease (CKD):     Stage 1 with normal or high GFR (GFR > 90 mL/min/1.73 square meters)    Stage 2 Mild CKD (GFR = 60-89 mL/min/1.73 square meters)    Stage 3A Moderate CKD (GFR = 45-59 mL/min/1.73 square meters)    Stage 3B Moderate CKD (GFR = 30-44 mL/min/1.73 square meters)    Stage 4 Severe CKD (GFR = 15-29 mL/min/1.73 square meters)    Stage 5 End Stage CKD (GFR <15 mL/min/1.73 square meters)  Note: GFR calculation is accurate only with a steady state creatinine    Magnesium [[id number]]  (Normal) Collected: 06/04/25 0919    Lab Status: Final result Specimen: Blood from Arm, Right Updated: 06/04/25 0938     Magnesium 1.9 mg/dL     CBC and differential [[id number]]  (Abnormal) Collected: 06/04/25 0919    Lab Status: Final result Specimen: Blood from Arm, Right Updated: 06/04/25 0924     WBC 10.85 Thousand/uL      RBC 5.51 Million/uL      Hemoglobin 17.1 g/dL      Hematocrit 49.4 %      MCV 90 fL      MCH 31.0 pg      MCHC 34.6 g/dL      RDW 12.3 %      MPV 11.5 fL      Platelets 198  Thousands/uL      nRBC 0 /100 WBCs      Segmented % 67 %      Immature Grans % 0 %      Lymphocytes % 21 %      Monocytes % 7 %      Eosinophils Relative 4 %      Basophils Relative 1 %      Absolute Neutrophils 7.25 Thousands/µL      Absolute Immature Grans 0.04 Thousand/uL      Absolute Lymphocytes 2.32 Thousands/µL      Absolute Monocytes 0.75 Thousand/µL      Eosinophils Absolute 0.40 Thousand/µL      Basophils Absolute 0.09 Thousands/µL             CT pe study w abdomen pelvis w contrast   Final Interpretation by Kevin Gray MD (06/04 1257)      1.  Negative for PE.   2.  No acute abnormality in the chest, abdomen, or pelvis.         Workstation performed: [id number]             Procedures    ED Medication and Procedure Management   Prior to Admission Medications   Prescriptions Last Dose Informant Patient Reported? Taking?   aspirin 81 mg chewable tablet  Self Yes No   Sig: Chew 81 mg in the morning.   carvedilol (COREG) 12.5 mg tablet  Self Yes No   Sig: Take 1 tablet by mouth in the morning and 1 tablet in the evening. Take with meals.   mirtazapine (REMERON) 45 MG tablet   Yes No   Sig: Take 45 mg by mouth every evening   ondansetron (ZOFRAN-ODT) 4 mg disintegrating tablet   No No   Sig: Take 1 tablet (4 mg total) by mouth every 6 (six) hours as needed for nausea or vomiting   pantoprazole (PROTONIX) 40 mg tablet  Self Yes No   Sig: Take 1 tablet by mouth every morning   prasugrel (EFFIENT) tablet  Self Yes No   Sig: Take 10 mg by mouth in the morning.   rosuvastatin (CRESTOR) 40 MG tablet  Self Yes No   Sig: Take 40 mg by mouth   sertraline (ZOLOFT) 25 mg tablet  Self Yes No   Sig: Take 25 mg by mouth in the morning.   spironolactone (ALDACTONE) 25 mg tablet  Self Yes No   Sig: Take 25 mg by mouth in the morning.   zolpidem (AMBIEN) 10 mg tablet   No No   Sig: Take 1 tablet (10 mg total) by mouth daily at bedtime as needed for sleep      Facility-Administered Medications: None     Patient's Medications    Discharge Prescriptions    No medications on file     No discharge procedures on file.  ED SEPSIS DOCUMENTATION   Time reflects when diagnosis was documented in both MDM as applicable and the Disposition within this note       Time User Action Codes Description Comment    6/4/2025  1:34 PM Shelley Tejeda [R55] Syncope     6/4/2025  1:34 PM Shelley Tejeda [Z95.810] ICD (implantable cardioverter-defibrillator) in place                    [1]   Past Medical History:  Diagnosis Date    Anxiety 2010    Cancer (HCC) 2024    Chronic kidney disease 2024    Coronary artery disease 2023    Depression 2010    Hypertension 2023    Myocardial infarction (HCC) 2023   [2]   Past Surgical History:  Procedure Laterality Date    CARDIAC SURGERY      KNEE SURGERY     [3]   Family History  Problem Relation Name Age of Onset    Breast cancer Mother Shania     Anxiety disorder Mother Shania     Bipolar disorder Mother Shania     Cancer Maternal Grandfather Bill     Heart disease Maternal Grandmother Gisell     Diabetes Paternal Grandmother Madai     Anxiety disorder Sister Janae    [4]   Social History  Tobacco Use    Smoking status: Every Day     Current packs/day: 0.50     Average packs/day: 0.5 packs/day for 30.0 years (15.0 ttl pk-yrs)     Types: Cigarettes    Smokeless tobacco: Never    Tobacco comments:     Smoke less then a half of a pack per day   Vaping Use    Vaping status: Never Used   Substance Use Topics    Alcohol use: Never    Drug use: Never        Shelley Tejeda DO  06/04/25 1826

## 2025-06-05 DIAGNOSIS — I25.10 CORONARY ARTERY DISEASE INVOLVING NATIVE HEART WITHOUT ANGINA PECTORIS, UNSPECIFIED VESSEL OR LESION TYPE: Primary | ICD-10-CM

## 2025-06-09 RX ORDER — ASPIRIN 81 MG
81 TABLET,CHEWABLE ORAL DAILY
Qty: 90 TABLET | Refills: 3 | Status: SHIPPED | OUTPATIENT
Start: 2025-06-09

## 2025-06-24 NOTE — PROGRESS NOTES
Name: Omi Boone Jr.      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 Surgical margins negative for carcinoma.   Lymphovascular invasion not identified.   - CT PE Study w A/P w contrast from 25 - No discrete pulmonary nodules visualized. Status post left nephrectomy. The right kidney is normal in appearance without evidence of hydronephrosis or nephrolithiasis. No evidence of abnormal soft tissue thickening at the resection site to suggest residual/recurrent disease.   - CMP ( 25) - Cr 1.37, GFR 60   - Follow up in 6 months with CT abdomen and CMP  Orders:    CT abdomen w wo contrast; Future    Comprehensive metabolic panel; Future        History of Present Illness   Omi Boone Jr. is a 45 y.o. male who presents for follow up of RCC. Transfer of care from [facility] urology. He had left radical nephrectomy in 2024 with [facility] for 7 cm left renal mass. Pathology positive for grade 2, clear cell RCC. He denies any flank pain or hematuria. Does note occasional slower urinary stream that is not bothersome to him at this time. No dysuria.     AUA SYMPTOM SCORE      Flowsheet Row Most Recent Value   AUA SYMPTOM SCORE    How often have you had a sensation of not emptying your bladder completely after you finished urinating? 2 (P)     How often have you had to urinate again less than two hours after you finished urinating? 4 (P)     How often have you found you stopped and started again several times when you urinate? 3 (P)     How often have you found it difficult to postpone urination? 2 (P)     How often have you had a weak urinary stream? 4 (P)     How  "often have you had to push or strain to begin urination? 4 (P)     How many times did you most typically get up to urinate from the time you went to bed at night until the time you got up in the morning? 1 (P)     Quality of Life: If you were to spend the rest of your life with your urinary condition just the way it is now, how would you feel about that? 2 (P)     AUA SYMPTOM SCORE 20 (P)            Review of Systems   Constitutional:  Negative for chills and fever.   Respiratory:  Negative for shortness of breath.    Cardiovascular:  Negative for chest pain.   Gastrointestinal:  Negative for abdominal pain.   Genitourinary:  Negative for difficulty urinating, dysuria, flank pain, frequency, hematuria and urgency.   Neurological:  Negative for dizziness.          Objective   There were no vitals taken for this visit.    Physical Exam  Constitutional:       Appearance: Normal appearance.   HENT:      Head: Normocephalic and atraumatic.      Right Ear: External ear normal.      Left Ear: External ear normal.      Nose: Nose normal.     Eyes:      General: No scleral icterus.     Conjunctiva/sclera: Conjunctivae normal.       Cardiovascular:      Pulses: Normal pulses.   Pulmonary:      Effort: Pulmonary effort is normal.     Musculoskeletal:         General: Normal range of motion.      Cervical back: Normal range of motion.     Neurological:      General: No focal deficit present.      Mental Status: He is alert and oriented to person, place, and time.     Psychiatric:         Mood and Affect: Mood normal.         Behavior: Behavior normal.         Thought Content: Thought content normal.         Judgment: Judgment normal.          Results   No results found for: \"PSA\"  Lab Results   Component Value Date    CALCIUM 10.1 06/04/2025    K 4.1 06/04/2025    CO2 27 06/04/2025     06/04/2025    BUN 14 06/04/2025    CREATININE 1.37 (H) 06/04/2025     Lab Results   Component Value Date    WBC 10.85 (H) 06/04/2025    " HGB 17.1 (H) 06/04/2025    HCT 49.4 (H) 06/04/2025    MCV 90 06/04/2025     06/04/2025       Office Urine Dip  No results found for this or any previous visit (from the past hour).

## 2025-06-25 ENCOUNTER — OFFICE VISIT (OUTPATIENT)
Dept: UROLOGY | Facility: CLINIC | Age: 45
End: 2025-06-25
Payer: COMMERCIAL

## 2025-06-25 ENCOUNTER — OFFICE VISIT (OUTPATIENT)
Dept: GASTROENTEROLOGY | Facility: CLINIC | Age: 45
End: 2025-06-25
Payer: COMMERCIAL

## 2025-06-25 VITALS
SYSTOLIC BLOOD PRESSURE: 128 MMHG | TEMPERATURE: 97.5 F | OXYGEN SATURATION: 96 % | HEART RATE: 78 BPM | BODY MASS INDEX: 29.95 KG/M2 | WEIGHT: 226 LBS | HEIGHT: 73 IN | DIASTOLIC BLOOD PRESSURE: 74 MMHG

## 2025-06-25 VITALS
HEIGHT: 73 IN | SYSTOLIC BLOOD PRESSURE: 128 MMHG | HEART RATE: 78 BPM | WEIGHT: 226 LBS | DIASTOLIC BLOOD PRESSURE: 74 MMHG | OXYGEN SATURATION: 96 % | TEMPERATURE: 97.5 F | BODY MASS INDEX: 29.95 KG/M2

## 2025-06-25 DIAGNOSIS — R11.2 NAUSEA AND VOMITING, UNSPECIFIED VOMITING TYPE: ICD-10-CM

## 2025-06-25 DIAGNOSIS — R68.81 EARLY SATIETY: ICD-10-CM

## 2025-06-25 DIAGNOSIS — I25.10 CORONARY ARTERY DISEASE INVOLVING NATIVE HEART WITHOUT ANGINA PECTORIS, UNSPECIFIED VESSEL OR LESION TYPE: ICD-10-CM

## 2025-06-25 DIAGNOSIS — R19.7 DIARRHEA, UNSPECIFIED TYPE: Primary | ICD-10-CM

## 2025-06-25 DIAGNOSIS — C64.2 RENAL CELL CARCINOMA OF LEFT KIDNEY (HCC): Primary | ICD-10-CM

## 2025-06-25 DIAGNOSIS — Z12.11 COLON CANCER SCREENING: ICD-10-CM

## 2025-06-25 PROCEDURE — 99204 OFFICE O/P NEW MOD 45 MIN: CPT | Performed by: PHYSICIAN ASSISTANT

## 2025-06-25 PROCEDURE — 99214 OFFICE O/P EST MOD 30 MIN: CPT | Performed by: PHYSICIAN ASSISTANT

## 2025-06-25 NOTE — PROGRESS NOTES
Name: Omi Boone Jr.      : 1980      MRN: [id number]  Encounter Provider: Leah Graff PA-C  Encounter Date: 2025   Encounter department: [facility] [place]  :  Assessment & Plan  Nausea and vomiting, unspecified vomiting type  Early satiety      Orders:    Ambulatory Referral to Gastroenterology    H. pylori antigen, stool; Future    TSH, 3rd generation; Future    T4, free; Future    NM gastric emptying; Future  Due to patient's recent cardiac history with PCI and stenting performed in 2024, it is unclear whether or not he would be able to obtain cardiac clearance.  Patient wishes that we do not contact his previous cardiologist, and he would like to establish with cardiology here, fortunately already has an appointment on July 10.  Because the patient has been experiencing dizziness and chest pain, we will await recommendations by cardiology prior to scheduling any type of procedure.  Patient likely will have to remain on Effient and aspirin uninterrupted until October, reports that he was told this previously.  His symptoms have a wide differential, and would benefit from endoscopy to investigate.  Doubt biliary etiology given recent MRI in November without findings suggesting cholelithiasis or sludge.  May be secondary to peptic ulcer disease, H. pylori, gastroparesis, hyperemesis of cannabis, cyclical vomiting syndrome, etc. For now given the circumstances with his heart, we will plan for noninvasive workup such as stool testing and blood work.  -Labs as above, gastric emptying study  -Patient reports he is already cut down on his marijuana usage, down to once daily  -Continue pantoprazole  -Continue Zofran every 6 hours as needed  -Small, frequent meals avoiding high fat and high fiber  Colon cancer screening    Orders:    Ambulatory Referral to Gastroenterology  See plan above.  Once patient is cleared to hold his anticoagulation, we will plan  "for an eventual colonoscopy.  Patient could theoretically have a Cologuard, but the patient would like the most sensitive test to investigate given his personal history of malignancy therefore we will hold off until we can obtain clearance.  Diarrhea, unspecified type    Orders:    Celiac Panel/Adult; Future    Coronary artery disease involving native heart without angina pectoris, unspecified vessel or lesion type  On Effient and aspirin.  Last cardiac intervention was in October 2024.  Follow-up after above workup and cardiology consult in July.  Patient agrees with above plan.    History of Present Illness   HPI  Omi Boone Jr. is a 45 y.o. male with history of clear-cell renal carcinoma status post nephrectomy in 2024, CAD on 81 mg aspirin and Effient, chronic systolic heart failure status post ICD, and hypertension who presents to the office today to establish with GI.  Patient reports for 1 year he has been having vomiting, primarily in the morning with daytime nausea.  Patient reports that he has had other ongoing symptoms that he will be following up with cardiology for.  Reports that he is switching his care from [facility] to [facility].  Patient reports that in his emesis, he usually does not see undigested food.  Mostly sees \"phlegm\" and \"brown chunks.\"  Patient denies signs or GI bleeding such as hematemesis or coffee-ground emesis.  However, does experience rectal bleeding with altered bowel habits.    Last CT scan was in June 2025 revealing no PE and no other acute abnormality in the chest, abdomen and pelvis.  His last MRI abdomen during workup for renal mass revealed that the gallbladder/bile ducts appeared normal and the pancreas appeared normal as well according to report.    He has never had an EGD or colonoscopy.  Reports over the last year he has lost over 100 pounds.      Review of Systems   Constitutional:  Negative for appetite change, chills, diaphoresis, fatigue and unexpected " weight change.   HENT:  Negative for sore throat and trouble swallowing.    Eyes:  Negative for discharge and redness.   Respiratory:  Negative for cough, shortness of breath and wheezing.    Cardiovascular:  Positive for chest pain. Negative for palpitations.   Gastrointestinal:  Positive for abdominal distention, anal bleeding, constipation, diarrhea, nausea and vomiting. Negative for abdominal pain, blood in stool and rectal pain.   Endocrine: Negative for cold intolerance and heat intolerance.   Musculoskeletal:  Negative for joint swelling and myalgias.   Skin:  Negative for pallor and rash.   Neurological:  Positive for dizziness. Negative for tremors, weakness, light-headedness, numbness and headaches.   Hematological:  Negative for adenopathy. Does not bruise/bleed easily.   Psychiatric/Behavioral:  Negative for behavioral problems, confusion, dysphoric mood and sleep disturbance. The patient is not nervous/anxious.           Objective   There were no vitals taken for this visit.     Physical Exam  Constitutional:       General: He is not in acute distress.     Appearance: He is well-developed. He is not diaphoretic.   HENT:      Head: Normocephalic and atraumatic.     Eyes:      General: No scleral icterus.     Conjunctiva/sclera: Conjunctivae normal.      Pupils: Pupils are equal, round, and reactive to light.     Neck:      Thyroid: No thyromegaly.      Trachea: No tracheal deviation.     Cardiovascular:      Rate and Rhythm: Normal rate and regular rhythm.   Pulmonary:      Effort: Pulmonary effort is normal.      Breath sounds: Normal breath sounds. No wheezing or rales.   Abdominal:      General: Bowel sounds are normal. There is no distension.      Palpations: Abdomen is soft. Abdomen is not rigid. There is no mass.      Tenderness: There is no abdominal tenderness. There is no guarding or rebound.     Musculoskeletal:      Cervical back: Neck supple.   Lymphadenopathy:      Cervical: No cervical  adenopathy.     Skin:     General: Skin is warm and dry.      Findings: No erythema or rash.     Neurological:      Mental Status: He is alert and oriented to person, place, and time.     Psychiatric:         Behavior: Behavior normal.

## 2025-06-26 PROBLEM — Z00.00 HEALTH MAINTENANCE EXAMINATION: Status: RESOLVED | Noted: 2025-05-27 | Resolved: 2025-06-26

## 2025-07-10 ENCOUNTER — OFFICE VISIT (OUTPATIENT)
Dept: CARDIOLOGY CLINIC | Facility: CLINIC | Age: 45
End: 2025-07-10
Payer: COMMERCIAL

## 2025-07-10 ENCOUNTER — IN-CLINIC DEVICE VISIT (OUTPATIENT)
Dept: CARDIOLOGY CLINIC | Facility: CLINIC | Age: 45
End: 2025-07-10
Payer: COMMERCIAL

## 2025-07-10 VITALS
HEART RATE: 80 BPM | BODY MASS INDEX: 29.16 KG/M2 | WEIGHT: 220 LBS | RESPIRATION RATE: 16 BRPM | DIASTOLIC BLOOD PRESSURE: 84 MMHG | SYSTOLIC BLOOD PRESSURE: 132 MMHG | OXYGEN SATURATION: 96 % | HEIGHT: 73 IN

## 2025-07-10 DIAGNOSIS — R07.9 CHEST PAIN, UNSPECIFIED TYPE: Primary | ICD-10-CM

## 2025-07-10 DIAGNOSIS — E78.2 MIXED HYPERLIPIDEMIA: ICD-10-CM

## 2025-07-10 DIAGNOSIS — F12.90 MARIJUANA USE: ICD-10-CM

## 2025-07-10 DIAGNOSIS — Z95.5 S/P PRIMARY ANGIOPLASTY WITH CORONARY STENT: ICD-10-CM

## 2025-07-10 DIAGNOSIS — Z90.5 STATUS POST NEPHRECTOMY: ICD-10-CM

## 2025-07-10 DIAGNOSIS — I25.10 CORONARY ARTERY DISEASE INVOLVING NATIVE HEART WITHOUT ANGINA PECTORIS, UNSPECIFIED VESSEL OR LESION TYPE: ICD-10-CM

## 2025-07-10 DIAGNOSIS — I50.42 CHRONIC COMBINED SYSTOLIC AND DIASTOLIC CONGESTIVE HEART FAILURE (HCC): ICD-10-CM

## 2025-07-10 DIAGNOSIS — Z95.810 ICD (IMPLANTABLE CARDIOVERTER-DEFIBRILLATOR) IN PLACE: ICD-10-CM

## 2025-07-10 DIAGNOSIS — Z72.0 TOBACCO USE: ICD-10-CM

## 2025-07-10 DIAGNOSIS — I25.5 ISCHEMIC CARDIOMYOPATHY: ICD-10-CM

## 2025-07-10 DIAGNOSIS — I47.20 VENTRICULAR TACHYCARDIA (HCC): Primary | ICD-10-CM

## 2025-07-10 PROCEDURE — 99205 OFFICE O/P NEW HI 60 MIN: CPT | Performed by: INTERNAL MEDICINE

## 2025-07-10 PROCEDURE — 93282 PRGRMG EVAL IMPLANTABLE DFB: CPT | Performed by: INTERNAL MEDICINE

## 2025-07-10 RX ORDER — SACUBITRIL AND VALSARTAN 24; 26 MG/1; MG/1
1 TABLET, FILM COATED ORAL 2 TIMES DAILY
COMMUNITY

## 2025-07-10 NOTE — ASSESSMENT & PLAN NOTE
Most recent transthoracic echo in February 2025 at [facility] reported LVEF of 35 to 39%.  Continue GDMT for HFrEF.  We will reevaluate LV function now that he had complete revascularization.

## 2025-07-10 NOTE — ASSESSMENT & PLAN NOTE
Patient admits to continued smoking cigarettes. He was informed of the adverse cardiovascular effects of marijuana and tobacco use and was strongly advised cessation.

## 2025-07-10 NOTE — ASSESSMENT & PLAN NOTE
Patient has hx of left clear-cell carcinoma status post nephrectomy in December 2024.  Avoid nephrotoxic medication such as NSAIDs.

## 2025-07-10 NOTE — PROGRESS NOTES
Results for orders placed or performed in visit on 07/10/25   Cardiac EP device report    Narrative    DEVICE INTERROGATED IN THE [place] OFFICE: PT TRANSFERRED CARE FROM [facility]: PT SEEN TODAY BY DR. CRAWFORD.  BATTERY VOLTAGE ADEQUATE (14 YRS). : <1%. ALL LEAD PARAMETERS WITHIN NORMAL LIMITS. NO SIGNIFICANT HIGH RATE EPISODES. HEARTLOGIC HEART FAILURE INDEX WITHIN NORMAL LIMITS @ 5. NO PROGRAMMING CHANGES MADE TO DEVICE PARAMETERS. NORMAL DEVICE FUNCTION. CH

## 2025-07-10 NOTE — PROGRESS NOTES
Cardiology Consultation     Omi Boone JrTom  [id number]  1980  [address] [facility] [place]  [address]  [place] [id number]    Assessment & Plan  Chest pain, unspecified type  An exercise treadmill stress with myocardial perfusion imaging is requested to evaluate reported atypical chest pain with known multivessel CAD.    Multivessel CAD, s/p primary angioplasty with coronary stent  Patient has known multivessel CAD status post PCI with NAKUL x 4 of the  of the RCA on October 11, 2024, status post PCI with NAKUL of the totally occluded OM1 in the setting of unstable angina on July 25, 2024, patent left main and patent proximal LAD stent on cardiac catheterization of July 25, 2024, status post primary PCI with 3.5 x 30 drug-eluting stent to the proximal LAD in the setting of acute anterior STEMI on February 7, 2023.    As discussed, an exercise treadmill stress with myocardial perfusion imaging is requested to objectively determine and/or correlate coronary ischemic burden to reported persistent chest pain. Continue OMT for CAD. Medical compliance to uninterrupted DAPT (ASA and Prasugrel) was reinforced.   Ischemic cardiomyopathy  Most recent transthoracic echo in February 2025 at [facility] reported LVEF of 35 to 39%.  Continue GDMT for HFrEF.  We will reevaluate LV function now that he had complete revascularization.  Chronic combined systolic and diastolic congestive heart failure (HCC)  At present, patient is compensated, continue GDMT for HFrEF with Entresto, spironolactone, carvedilol.  Patient reported intolerance to SGLT2 inhibitors specifically Jardiance with GI symptoms.  ICD (implantable cardioverter-defibrillator) in place  Patient would like to transfer ICD care and follow-up to [facility].  Device interrogation today showed normal function without any  events.  Mixed hyperlipidemia  LDL goal should be less than 55.  Continue rosuvastatin 40 mg p.o. once daily.  Status post nephrectomy  Patient has hx of left clear-cell carcinoma status post nephrectomy in December 2024.  Avoid nephrotoxic medication such as NSAIDs.  Tobacco use  Patient admits to continued smoking cigarettes. He was informed of the adverse cardiovascular effects of marijuana and tobacco use and was strongly advised cessation.  Marijuana use  Patient admits to regular marijuana smoking.  He was informed of the adverse cardiovascular effects of marijuana and tobacco use and was strongly advised cessation.     HPI:  This is a 45-year-old male who is a former cardiac patient of Dr. Maisha Hernandez of [facility].  He was last seen by Dr. Hernandez on March 17, 2025.  Patient is transferring care to [facility] cardiology.  Patient has known multivessel CAD status post PCI with NAKUL x 4 of the  of the RCA on October 11, 2024, status post PCI with NAKUL of the totally occluded OM1 in the setting of unstable angina on July 25, 2024, patent left main and patent proximal LAD stent on cardiac catheterization of July 25, 2024, status post primary PCI with 3.5 x 30 drug-eluting stent to the proximal LAD in the setting of acute anterior STEMI on February 7, 2023.      In addition, patient also has a known history of ischemic cardiomyopathy with LVEF 35 to 39%, chronic combined systolic and diastolic congestive heart failure, status post single-chamber ICD implantation on September 25, 2023, history of hypertension, mixed hyperlipidemia.  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    At present, patient complains of recurrent chest pain described as knifelike sensation occurring randomly, 2-3 times per week, relieved with 1 to 2 tablets of nitroglycerin  sublingual.  No PND, no orthopnea.  He also complains of recurrent nausea and vomiting occurring daily.  He had a GI consultation for the symptoms and was started on Zofran and PPI.  He is also scheduled to have a gastric emptying study.  There is also plan of doing endoscopy pending cardiac clearance.     Unfortunately, patient continues to smoke cigarettes and marijuana.  He admits to smoking 4 cigarettes daily, and 2 hits of marijuana at night.  He regular denies alcohol drinking.  He claims to be compliant with his medications.  He complained of intermittent easy bruisability and bleeding on the heels of both feet but no apparent bleeding on the heels when examined physically.    Patient had a recent ER visit for syncope on June 4, 2025 at [facility].  He reported having syncopal episode while working on his desk.  Cardiac workup were unrevealing which included normal serial troponin, no acute ischemic EKG changes, and no events on ICD device interrogation.  Chest CT for PE was negative with no intrathoracic abnormality noted.  ICD device interrogation during today's office visit showed no events with normal functioning.  Heart failure logic' was within normal limit.    Problem List[1]  Past Medical History[2]  Social History     Socioeconomic History   • Marital status:      Spouse name: Not on file   • Number of children: Not on file   • Years of education: Not on file   • Highest education level: Not on file   Occupational History   • Not on file   Tobacco Use   • Smoking status: Every Day     Current packs/day: 0.50     Average packs/day: 0.5 packs/day for 30.0 years (15.0 ttl pk-yrs)     Types: Cigarettes   • Smokeless tobacco: Never   • Tobacco comments:     Smoke less then a half of a pack per day   Vaping Use   • Vaping status: Never Used   Substance and Sexual Activity   • Alcohol use: Never   • Drug use: Not on file   • Sexual activity: Yes     Partners: Female   Other  Topics Concern   • Not on file   Social History Narrative   • Not on file     Social Drivers of Health     Financial Resource Strain: Medium Risk (12/16/2024)    Received from [facility]    Overall Financial Resource Strain (CARDIA)    • Difficulty of Paying Living Expenses: Somewhat hard   Food Insecurity: Food Insecurity Present (12/16/2024)    Received from [facility]    Hunger Vital Sign    • Within the past 12 months, you worried that your food would run out before you got the money to buy more.: Sometimes true    • Within the past 12 months, the food you bought just didn't last and you didn't have money to get more.: Sometimes true   Transportation Needs: Unmet Transportation Needs (12/16/2024)    Received from [facility]    PRAPARE - Transportation    • Lack of Transportation (Medical): Yes    • Lack of Transportation (Non-Medical): Yes   Physical Activity: Not on file   Stress: Patient Declined (3/31/2024)    Received from [facility]    [employer] [place] [employer] - Occupational Stress Questionnaire    • Feeling of Stress : Patient declined   Social Connections: Feeling Socially Integrated (12/6/2024)    Received from [facility]    OASIS : Social Isolation    • How often do you feel lonely or isolated from those around you?: Rarely   Intimate Partner Violence: Not At Risk (12/16/2024)    Received from [facility]    Humiliation, Afraid, Rape, and Kick questionnaire    • Within the last year, have you been afraid of your partner or ex-partner?: No    • Within the last year, have you been humiliated or emotionally abused in other ways by your partner or ex-partner?: No    • Within the last year, have you been kicked, hit, slapped, or otherwise physically hurt by your partner or ex-partner?: No    • Within the last year, have you been raped or forced to have any kind of sexual activity by  "your partner or ex-partner?: No   Housing Stability: High Risk (12/16/2024)    Received from [facility]    Housing Stability Vital Sign    • In the last 12 months, was there a time when you were not able to pay the mortgage or rent on time?: Yes    • In the past 12 months, how many times have you moved where you were living?: 1    • At any time in the past 12 months, were you homeless or living in a shelter (including now)?: No      Family History[3]  Past Surgical History[4]  Current Medications[5]  No Known Allergies  Vitals:    07/10/25 0839   BP: 132/84   BP Location: Left arm   Patient Position: Sitting   Cuff Size: Large   Pulse: 80   Resp: 16   SpO2: 96%   Weight: 99.8 kg (220 lb)   Height: 6' 1\" (1.854 m)       Labs:  Lab Results   Component Value Date    K 4.1 06/04/2025    K 4.3 12/19/2024     06/04/2025     12/19/2024    CO2 27 06/04/2025    CO2 23 12/19/2024    BUN 14 06/04/2025    BUN 16 12/19/2024    CREATININE 1.37 (H) 06/04/2025    CREATININE 1.52 (H) 12/19/2024    CALCIUM 10.1 06/04/2025    CALCIUM 9.7 12/19/2024     Lab Results   Component Value Date    WBC 10.85 (H) 06/04/2025    HGB 17.1 (H) 06/04/2025    HGB 14.6 12/16/2024    HCT 49.4 (H) 06/04/2025    HCT 43.2 12/16/2024    MCV 90 06/04/2025     06/04/2025     Lab Results   Component Value Date    TRIG 110 06/17/2024    HDL 36 (L) 06/17/2024     Imaging: Cardiac EP device report  Result Date: 7/10/2025  Narrative: DEVICE INTERROGATED IN THE [place] OFFICE: PT TRANSFERRED CARE FROM [facility]: PT SEEN TODAY BY DR. POTTER  BATTERY VOLTAGE ADEQUATE (14 YRS). : <1%. ALL LEAD PARAMETERS WITHIN NORMAL LIMITS. NO SIGNIFICANT HIGH RATE EPISODES. HEARTLOGIC HEART FAILURE INDEX WITHIN NORMAL LIMITS @ 5. NO PROGRAMMING CHANGES MADE TO DEVICE PARAMETERS. NORMAL DEVICE FUNCTION. CH     REMOTE HF INTERROGATION  Impression: Routine remote interrogation of implanted device physiologic sensors; HeartLogic index is 0, below " alert threshold of 16; No sensor-based evidence of CHF exacerbation; Presenting rhythm = VS; No episodes detected, no therapies delivered; 0% ; Appropriately functioning device.      Review of Systems:  Review of Systems   Constitutional:  Negative for activity change.   Cardiovascular:  Positive for chest pain. Negative for palpitations and leg swelling.   Gastrointestinal:  Positive for nausea and vomiting.   Hematological:  Bruises/bleeds easily.   All other systems reviewed and are negative.      Physical Exam:  Vitals and nursing note reviewed.   Constitutional:       General: not in acute distress.     Appearance: well-developed.   HENT:      Head: Normocephalic and atraumatic.   Neck:     Supple, no JVD, no carotid bruit.  Cardiovascular:      Rate and Rhythm: Normal rate. Rhythm regular.     Heart sounds: No murmur. Normal S1S2, No gallops. No rubs.      No pedal edema.   Pulmonary:      Effort: Pulmonary effort is normal. No respiratory distress.      Breath sounds: Normal breath sounds.   Abdominal:      Palpations: Abdomen is soft, no distention.     Tenderness: There is no abdominal tenderness.   Musculoskeletal:         General: No swelling.      Cervical back: Neck supple.   Skin:     General: Skin is warm and dry.    Neurological:      Mental Status: awake, alert, oriented x 3.   Psychiatric:         Mood and Affect: Mood normal.     I have spent a total time of 55 minutes in caring for this patient on the day of the visit/encounter including Diagnostic results, Instructions for management, Patient and family education, Importance of tx compliance, Risk factor reductions, Impressions, Documenting in the medical record, and Obtaining or reviewing history  .          [1]  Patient Active Problem List  Diagnosis   • Coronary artery disease involving native heart without angina pectoris   • Primary hypertension   • Hyperlipidemia   • Gastroesophageal reflux disease   • Insomnia   • Depression   • Tobacco  use   • Cardiomyopathy (HCC)   • Pacemaker   • Status post nephrectomy   • ICD (implantable cardioverter-defibrillator) in place   • Elevated blood sugar   [2]  Past Medical History:  Diagnosis Date   • Anxiety 2010   • Cancer (HCC) 2024   • Chronic kidney disease 2024   • Coronary artery disease 2023   • Depression 2010   • Hypertension 2023   • Myocardial infarction (HCC) 2023   • Renal cancer (HCC)    [3]  Family History  Problem Relation Name Age of Onset   • Breast cancer Mother Shania    • Anxiety disorder Mother Shania    • Bipolar disorder Mother Shania    • Cancer Maternal Grandfather Hugo    • Heart disease Maternal Grandmother Gisell    • Diabetes Paternal Grandmother Madai    • Anxiety disorder Sister Janae    [4]  Past Surgical History:  Procedure Laterality Date   • CARDIAC SURGERY     • KNEE SURGERY     [5]    Current Outpatient Medications:   •  Aspirin Low Dose 81 MG chewable tablet, TAKE 1 TABLET BY MOUTH EVERY DAY, Disp: 90 tablet, Rfl: 3  •  carvedilol (COREG) 12.5 mg tablet, Take 1 tablet by mouth in the morning and 1 tablet in the evening. Take with meals., Disp: , Rfl:   •  mirtazapine (REMERON) 45 MG tablet, Take 45 mg by mouth every evening, Disp: , Rfl:   •  ondansetron (ZOFRAN-ODT) 4 mg disintegrating tablet, Take 1 tablet (4 mg total) by mouth every 6 (six) hours as needed for nausea or vomiting, Disp: 30 tablet, Rfl: 5  •  pantoprazole (PROTONIX) 40 mg tablet, Take 1 tablet by mouth every morning, Disp: , Rfl:   •  prasugrel (EFFIENT) tablet, Take 10 mg by mouth in the morning., Disp: , Rfl:   •  rosuvastatin (CRESTOR) 40 MG tablet, Take 40 mg by mouth, Disp: , Rfl:   •  sacubitril-valsartan (Entresto) 24-26 MG TABS, Take 1 tablet by mouth 2 (two) times a day, Disp: , Rfl:   •  sertraline (ZOLOFT) 25 mg tablet, Take 25 mg by mouth in the morning., Disp: , Rfl:   •  spironolactone (ALDACTONE) 25 mg tablet, Take 25 mg by mouth in the morning., Disp: , Rfl:   •  zolpidem (AMBIEN) 10 mg  tablet, Take 1 tablet (10 mg total) by mouth daily at bedtime as needed for sleep, Disp: 90 tablet, Rfl: 1

## 2025-07-10 NOTE — ASSESSMENT & PLAN NOTE
Patient would like to transfer ICD care and follow-up to [facility].  Device interrogation today showed normal function without any events.

## 2025-07-17 ENCOUNTER — HOSPITAL ENCOUNTER (OUTPATIENT)
Dept: NON INVASIVE DIAGNOSTICS | Facility: CLINIC | Age: 45
Discharge: HOME | End: 2025-07-17
Attending: INTERNAL MEDICINE

## 2025-07-21 ENCOUNTER — HOSPITAL ENCOUNTER (OUTPATIENT)
Dept: NON INVASIVE DIAGNOSTICS | Facility: CLINIC | Age: 45
Discharge: HOME | End: 2025-07-21
Attending: INTERNAL MEDICINE
Payer: COMMERCIAL

## 2025-07-21 VITALS
HEART RATE: 72 BPM | OXYGEN SATURATION: 99 % | HEIGHT: 73 IN | SYSTOLIC BLOOD PRESSURE: 132 MMHG | BODY MASS INDEX: 29.16 KG/M2 | DIASTOLIC BLOOD PRESSURE: 84 MMHG | WEIGHT: 220 LBS

## 2025-07-21 LAB
MAX HR PERCENT: 87 %
MAX HR: 151 BPM
RATE PRESSURE PRODUCT: NORMAL
SL CV REST NUCLEAR ISOTOPE DOSE: 10.01 MCI
SL CV STRESS NUCLEAR ISOTOPE DOSE: 31.2 MCI
SL CV STRESS RECOVERY BP: NORMAL MMHG
SL CV STRESS RECOVERY HR: 96 BPM
SL CV STRESS RECOVERY O2 SAT: 97 %
SL CV STRESS STAGE REACHED: 3
SPECT HRT GATED+EF W RNC IV: 32 %
STRESS ANGINA INDEX: 0
STRESS BASELINE BP: NORMAL MMHG
STRESS BASELINE HR: 72 BPM
STRESS O2 SAT REST: 99 %
STRESS PEAK HR: 151 BPM
STRESS POST ESTIMATED WORKLOAD: 10.1 METS
STRESS POST EXERCISE DUR MIN: 9 MIN
STRESS POST EXERCISE DUR SEC: 30 SEC
STRESS POST O2 SAT PEAK: 97 %
STRESS POST PEAK BP: 194 MMHG
STRESS/REST PERFUSION RATIO: 1.04

## 2025-07-21 PROCEDURE — 93016 CV STRESS TEST SUPVJ ONLY: CPT | Performed by: INTERNAL MEDICINE

## 2025-07-21 PROCEDURE — 93018 CV STRESS TEST I&R ONLY: CPT | Performed by: INTERNAL MEDICINE

## 2025-07-21 PROCEDURE — 78452 HT MUSCLE IMAGE SPECT MULT: CPT

## 2025-07-21 PROCEDURE — 93017 CV STRESS TEST TRACING ONLY: CPT

## 2025-07-21 PROCEDURE — A9502 TC99M TETROFOSMIN: HCPCS

## 2025-07-21 PROCEDURE — 78452 HT MUSCLE IMAGE SPECT MULT: CPT | Performed by: INTERNAL MEDICINE

## 2025-07-21 RX ORDER — REGADENOSON 0.08 MG/ML
0.4 INJECTION, SOLUTION INTRAVENOUS ONCE
Status: DISCONTINUED | OUTPATIENT
Start: 2025-07-21 | End: 2025-07-22 | Stop reason: HOSPADM

## 2025-07-25 LAB
CHEST PAIN STATEMENT: NORMAL
MAX DIASTOLIC BP: 88 MMHG
MAX PREDICTED HEART RATE: 173 BPM
PROTOCOL NAME: NORMAL
STRESS POST EXERCISE DUR MIN: 9 MIN
STRESS POST EXERCISE DUR SEC: 30 SEC
STRESS POST PEAK HR: 151 BPM
STRESS POST PEAK SYSTOLIC BP: 194 MMHG
TARGET HR FORMULA: NORMAL
TEST INDICATION: NORMAL

## 2025-07-31 ENCOUNTER — HOSPITAL ENCOUNTER (OUTPATIENT)
Dept: NON INVASIVE DIAGNOSTICS | Facility: CLINIC | Age: 45
Discharge: HOME | End: 2025-07-31
Payer: COMMERCIAL

## 2025-07-31 ENCOUNTER — RESULTS FOLLOW-UP (OUTPATIENT)
Dept: CARDIOLOGY CLINIC | Facility: CLINIC | Age: 45
End: 2025-07-31

## 2025-07-31 VITALS
WEIGHT: 220 LBS | HEART RATE: 80 BPM | SYSTOLIC BLOOD PRESSURE: 132 MMHG | HEIGHT: 73 IN | BODY MASS INDEX: 29.16 KG/M2 | DIASTOLIC BLOOD PRESSURE: 84 MMHG

## 2025-07-31 DIAGNOSIS — I25.10 CORONARY ARTERY DISEASE INVOLVING NATIVE HEART WITHOUT ANGINA PECTORIS, UNSPECIFIED VESSEL OR LESION TYPE: Primary | ICD-10-CM

## 2025-07-31 DIAGNOSIS — I42.9 CARDIOMYOPATHY, UNSPECIFIED TYPE (HCC): ICD-10-CM

## 2025-07-31 LAB
AORTIC ROOT: 3.4 CM
BSA FOR ECHO PROCEDURE: 2.24 M2
DOP CALC LVOT AREA: 3.14 CM2
DOP CALC LVOT DIAMETER: 2 CM
E WAVE DECELERATION TIME: 311 MS
E/A RATIO: 0.63
FRACTIONAL SHORTENING: 25 (ref 28–44)
INTERVENTRICULAR SEPTUM IN DIASTOLE (PARASTERNAL SHORT AXIS VIEW): 0.9 CM
INTERVENTRICULAR SEPTUM: 0.9 CM (ref 0.6–1.1)
LEFT ATRIUM SIZE: 3.7 CM
LEFT INTERNAL DIMENSION IN SYSTOLE: 3.9 CM (ref 2.1–4)
LEFT VENTRICLE DIASTOLIC VOLUME (MOD BIPLANE): 172 ML
LEFT VENTRICLE DIASTOLIC VOLUME INDEX (MOD BIPLANE): 76.8 ML/M2
LEFT VENTRICLE SYSTOLIC VOLUME (MOD BIPLANE): 81 ML
LEFT VENTRICLE SYSTOLIC VOLUME INDEX (MOD BIPLANE): 36.2 ML/M2
LEFT VENTRICULAR INTERNAL DIMENSION IN DIASTOLE: 5.2 CM (ref 3.5–6)
LEFT VENTRICULAR POSTERIOR WALL IN END DIASTOLE: 0.9 CM
LEFT VENTRICULAR STROKE VOLUME: 61 ML
LV EF BIPLANE MOD: 53 %
LV EF US.2D.A4C+ESTIMATED: 54 %
LVSV (TEICH): 61 ML
MV E'TISSUE VEL-SEP: 8 CM/S
MV PEAK A VEL: 0.91 M/S
MV PEAK E VEL: 57 CM/S
MV STENOSIS PRESSURE HALF TIME: 90 MS
MV VALVE AREA P 1/2 METHOD: 2.44
SL CV LV EF: 36
SL CV PED ECHO LEFT VENTRICLE DIASTOLIC VOLUME (MOD BIPLANE) 2D: 129 ML
SL CV PED ECHO LEFT VENTRICLE SYSTOLIC VOLUME (MOD BIPLANE) 2D: 68 ML

## 2025-07-31 PROCEDURE — 93325 DOPPLER ECHO COLOR FLOW MAPG: CPT | Performed by: INTERNAL MEDICINE

## 2025-07-31 PROCEDURE — 93325 DOPPLER ECHO COLOR FLOW MAPG: CPT

## 2025-07-31 PROCEDURE — 93321 DOPPLER ECHO F-UP/LMTD STD: CPT | Performed by: INTERNAL MEDICINE

## 2025-07-31 PROCEDURE — 93321 DOPPLER ECHO F-UP/LMTD STD: CPT

## 2025-07-31 PROCEDURE — 93308 TTE F-UP OR LMTD: CPT | Performed by: INTERNAL MEDICINE

## 2025-07-31 PROCEDURE — 93308 TTE F-UP OR LMTD: CPT

## 2025-07-31 RX ORDER — CARVEDILOL 12.5 MG/1
12.5 TABLET ORAL 2 TIMES DAILY WITH MEALS
Qty: 60 TABLET | Refills: 3 | Status: SHIPPED | OUTPATIENT
Start: 2025-07-31

## 2025-07-31 RX ORDER — SPIRONOLACTONE 25 MG/1
25 TABLET ORAL DAILY
Qty: 30 TABLET | Refills: 11 | Status: SHIPPED | OUTPATIENT
Start: 2025-07-31 | End: 2026-07-31

## 2025-07-31 RX ORDER — PRASUGREL 10 MG/1
10 TABLET, FILM COATED ORAL DAILY
Qty: 30 TABLET | Refills: 11 | Status: SHIPPED | OUTPATIENT
Start: 2025-07-31 | End: 2026-07-31

## 2025-07-31 RX ORDER — ROSUVASTATIN CALCIUM 40 MG/1
40 TABLET, COATED ORAL DAILY
Qty: 30 TABLET | Refills: 3 | Status: SHIPPED | OUTPATIENT
Start: 2025-07-31

## 2025-07-31 RX ORDER — PANTOPRAZOLE SODIUM 40 MG/1
40 TABLET, DELAYED RELEASE ORAL EVERY MORNING
Qty: 30 TABLET | Refills: 3 | Status: SHIPPED | OUTPATIENT
Start: 2025-07-31

## 2025-08-06 ENCOUNTER — NURSE TRIAGE (OUTPATIENT)
Age: 45
End: 2025-08-06

## 2025-08-06 ENCOUNTER — APPOINTMENT (OUTPATIENT)
Dept: LAB | Facility: CLINIC | Age: 45
End: 2025-08-06
Payer: COMMERCIAL

## 2025-08-06 DIAGNOSIS — I42.9 CARDIOMYOPATHY, UNSPECIFIED TYPE (HCC): ICD-10-CM

## 2025-08-06 DIAGNOSIS — I25.10 CORONARY ARTERY DISEASE INVOLVING NATIVE HEART WITHOUT ANGINA PECTORIS, UNSPECIFIED VESSEL OR LESION TYPE: Primary | ICD-10-CM

## 2025-08-07 ENCOUNTER — HOSPITAL ENCOUNTER (OUTPATIENT)
Dept: NUCLEAR MEDICINE | Facility: HOSPITAL | Age: 45
End: 2025-08-07
Attending: PHYSICIAN ASSISTANT
Payer: COMMERCIAL

## 2025-08-07 DIAGNOSIS — I50.22 CHRONIC HEART FAILURE WITH REDUCED EJECTION FRACTION (HFREF, <= 40%) (HCC): ICD-10-CM

## 2025-08-07 DIAGNOSIS — E78.5 HYPERLIPIDEMIA, UNSPECIFIED HYPERLIPIDEMIA TYPE: Primary | ICD-10-CM

## 2025-08-07 DIAGNOSIS — I42.9 CARDIOMYOPATHY, UNSPECIFIED TYPE (HCC): Primary | ICD-10-CM

## 2025-08-07 RX ORDER — EZETIMIBE 10 MG/1
10 TABLET ORAL DAILY
Qty: 90 TABLET | Refills: 5 | Status: SHIPPED | OUTPATIENT
Start: 2025-08-07 | End: 2026-08-02

## 2025-08-07 RX ORDER — SACUBITRIL AND VALSARTAN 49; 51 MG/1; MG/1
1 TABLET, FILM COATED ORAL 2 TIMES DAILY
Qty: 180 TABLET | Refills: 3 | Status: SHIPPED | OUTPATIENT
Start: 2025-08-07

## 2025-08-08 ENCOUNTER — OFFICE VISIT (OUTPATIENT)
Dept: CARDIOLOGY CLINIC | Facility: CLINIC | Age: 45
End: 2025-08-08
Payer: COMMERCIAL

## 2025-08-08 ENCOUNTER — E-CONSULT (OUTPATIENT)
Age: 45
End: 2025-08-08
Payer: COMMERCIAL

## 2025-08-08 ENCOUNTER — APPOINTMENT (OUTPATIENT)
Dept: LAB | Facility: CLINIC | Age: 45
End: 2025-08-08
Payer: COMMERCIAL

## 2025-08-08 VITALS
SYSTOLIC BLOOD PRESSURE: 114 MMHG | BODY MASS INDEX: 29.18 KG/M2 | DIASTOLIC BLOOD PRESSURE: 80 MMHG | HEIGHT: 73 IN | OXYGEN SATURATION: 98 % | WEIGHT: 220.2 LBS | HEART RATE: 85 BPM

## 2025-08-08 DIAGNOSIS — I25.10 CORONARY ARTERY DISEASE INVOLVING NATIVE CORONARY ARTERY OF NATIVE HEART WITHOUT ANGINA PECTORIS: ICD-10-CM

## 2025-08-08 DIAGNOSIS — Z95.810 ICD (IMPLANTABLE CARDIOVERTER-DEFIBRILLATOR) IN PLACE: ICD-10-CM

## 2025-08-08 DIAGNOSIS — D75.1 POLYCYTHEMIA: ICD-10-CM

## 2025-08-08 DIAGNOSIS — I10 PRIMARY HYPERTENSION: ICD-10-CM

## 2025-08-08 DIAGNOSIS — I25.5 ISCHEMIC CARDIOMYOPATHY: ICD-10-CM

## 2025-08-08 DIAGNOSIS — E78.2 MIXED HYPERLIPIDEMIA: ICD-10-CM

## 2025-08-08 DIAGNOSIS — R55 SYNCOPE AND COLLAPSE: ICD-10-CM

## 2025-08-08 DIAGNOSIS — D58.2 ELEVATED HEMOGLOBIN (HCC): Primary | ICD-10-CM

## 2025-08-08 PROCEDURE — 99214 OFFICE O/P EST MOD 30 MIN: CPT

## 2025-08-08 PROCEDURE — 99451 NTRPROF PH1/NTRNET/EHR 5/>: CPT | Performed by: NURSE PRACTITIONER

## 2025-08-08 RX ORDER — NITROGLYCERIN 0.4 MG/1
0.4 TABLET SUBLINGUAL
Qty: 30 TABLET | Refills: 1 | Status: SHIPPED | OUTPATIENT
Start: 2025-08-08

## 2025-08-09 ENCOUNTER — PATIENT MESSAGE (OUTPATIENT)
Dept: FAMILY MEDICINE CLINIC | Facility: CLINIC | Age: 45
End: 2025-08-09